# Patient Record
(demographics unavailable — no encounter records)

---

## 2024-10-07 NOTE — PHYSICAL EXAM
[FreeTextEntry3] :   multiple benign nevi and lentigines + inflamed, waxy, keratotic papule on left lower back

## 2024-10-07 NOTE — ASSESSMENT
[FreeTextEntry1] : 1) benign findings as above- education  2) ISK -treated with cautery at setting of 2.5; SED including burning, scarring, and incomplete tx. patient verbalized understanding  f/u PRN

## 2024-10-07 NOTE — HISTORY OF PRESENT ILLNESS
[FreeTextEntry1] : growths [de-identified] : 29 year old female. 6.5 month pregnant. enlarging growths. left lower back growing.